# Patient Record
Sex: FEMALE | Race: WHITE
[De-identification: names, ages, dates, MRNs, and addresses within clinical notes are randomized per-mention and may not be internally consistent; named-entity substitution may affect disease eponyms.]

---

## 2017-08-05 NOTE — CPEKG
Heart Rate: 78

RR Interval: 769

P-R Interval: 180

QRSD Interval: 90

QT Interval: 404

QTC Interval: 461

P Axis: 56

QRS Axis: 20

T Wave Axis: 10

EKG Severity - BORDERLINE ECG -

EKG Impression: SINUS RHYTHM

EKG Impression: LOW VOLTAGE THROUGHOUT

EKG Impression: BORDERLINE T ABNORMALITIES, ANTERIOR LEADS

Electronically Signed By: Gaston Parnell 08-Aug-2017 06:11:03

## 2018-09-17 ENCOUNTER — HOSPITAL ENCOUNTER (OUTPATIENT)
Dept: HOSPITAL 80 - FIMAGING | Age: 63
End: 2018-09-17
Attending: FAMILY MEDICINE
Payer: COMMERCIAL

## 2018-09-17 DIAGNOSIS — Z12.31: Primary | ICD-10-CM

## 2019-02-28 ENCOUNTER — HOSPITAL ENCOUNTER (OUTPATIENT)
Dept: HOSPITAL 80 - FIMAGING | Age: 64
End: 2019-02-28
Attending: INTERNAL MEDICINE
Payer: COMMERCIAL

## 2019-02-28 DIAGNOSIS — J44.9: ICD-10-CM

## 2019-02-28 DIAGNOSIS — R91.8: Primary | ICD-10-CM

## 2019-02-28 DIAGNOSIS — I27.20: ICD-10-CM

## 2019-02-28 DIAGNOSIS — I51.7: ICD-10-CM

## 2019-02-28 DIAGNOSIS — R09.02: ICD-10-CM

## 2019-02-28 DIAGNOSIS — R06.02: ICD-10-CM
